# Patient Record
Sex: MALE | Race: WHITE | NOT HISPANIC OR LATINO
[De-identification: names, ages, dates, MRNs, and addresses within clinical notes are randomized per-mention and may not be internally consistent; named-entity substitution may affect disease eponyms.]

---

## 2020-07-02 PROBLEM — Z00.00 ENCOUNTER FOR PREVENTIVE HEALTH EXAMINATION: Status: ACTIVE | Noted: 2020-07-02

## 2020-07-09 ENCOUNTER — APPOINTMENT (OUTPATIENT)
Dept: OTOLARYNGOLOGY | Facility: CLINIC | Age: 65
End: 2020-07-09
Payer: MEDICAID

## 2020-07-09 PROCEDURE — 99203 OFFICE O/P NEW LOW 30 MIN: CPT | Mod: 95

## 2020-07-09 NOTE — HISTORY OF PRESENT ILLNESS
[Home] : at home, [unfilled] , at the time of the visit. [Verbal consent obtained from patient] : the patient, [unfilled] [Medical Office: ([facility])___] : at the medical office located in  [de-identified] : Initial visit. Done face-to-face on the computer secondary to Covid. Consent was given and obtained.\par He reports that 2 months ago while in [place] where he is now he developed exacerbation of his allergies. According to him he was treated with 2 courses of antibiotics and never improved. He also reports he developed significant bilateral ear pressure. He now has tinnitus bilaterally but worse in the right and left.\par He does have a history of recurrent otitis media by his report.\par Is not having any vestibular symptoms.

## 2020-07-16 ENCOUNTER — RESULT CHARGE (OUTPATIENT)
Age: 65
End: 2020-07-16

## 2020-07-17 ENCOUNTER — APPOINTMENT (OUTPATIENT)
Dept: OTOLARYNGOLOGY | Facility: CLINIC | Age: 65
End: 2020-07-17
Payer: MEDICAID

## 2020-07-17 DIAGNOSIS — H93.8X9 OTHER SPECIFIED DISORDERS OF EAR, UNSPECIFIED EAR: ICD-10-CM

## 2020-07-17 DIAGNOSIS — H93.12 TINNITUS, LEFT EAR: ICD-10-CM

## 2020-07-17 DIAGNOSIS — H90.A22 SENSORINEURAL HEARING LOSS, UNILATERAL, LEFT EAR, WITH RESTRICTED HEARING ON THE CONTRALATERAL SIDE: ICD-10-CM

## 2020-07-17 DIAGNOSIS — H93.13 TINNITUS, BILATERAL: ICD-10-CM

## 2020-07-17 DIAGNOSIS — H92.01 OTALGIA, RIGHT EAR: ICD-10-CM

## 2020-07-17 DIAGNOSIS — H69.80 OTHER SPECIFIED DISORDERS OF EUSTACHIAN TUBE, UNSPECIFIED EAR: ICD-10-CM

## 2020-07-17 DIAGNOSIS — M26.609 UNSPECIFIED TEMPOROMANDIBULAR JOINT DISORDER: ICD-10-CM

## 2020-07-17 PROCEDURE — 92557 COMPREHENSIVE HEARING TEST: CPT

## 2020-07-17 PROCEDURE — 92567 TYMPANOMETRY: CPT

## 2020-07-17 PROCEDURE — 99203 OFFICE O/P NEW LOW 30 MIN: CPT | Mod: 25

## 2020-07-17 PROCEDURE — 31231 NASAL ENDOSCOPY DX: CPT

## 2020-07-27 PROBLEM — H69.80 EUSTACHIAN TUBE DYSFUNCTION: Status: ACTIVE | Noted: 2020-07-27

## 2020-07-27 NOTE — ASSESSMENT
[FreeTextEntry1] : He has a normal exam. He is unilateral tinnitus with a slight asymmetric hearing loss. He will be sent for an MRI to rule out a retrocochlear lesion seen in followup.

## 2020-07-27 NOTE — PROCEDURE
[FreeTextEntry6] : PROCEDURE NOTES\par \par PROCEDURE: Nasal endoscopy \par SURGEON: Dr. Lucero\par INDICATIONS: Assess for chronic sinusitis. \par ANESTHESIA: The patient was placed in a sitting position.  Following application of the topical anesthetic and decongestant, exam was performed with a zero degree endoscope.  The scope was passed along the right nasal floor to the nasopharynx.  It was then passed into the region of the middle meatus, middle turbinate, and sphenoethmoid region.  An identical procedure was performed on the left side.  The following findings were noted:\par \par The nasal mucosa was healthy appearing and the septum was roughly midline. The middle meatus and sphenoethmoid recesses were clear bilaterally. The nasopharynx was normal. \par

## 2020-07-27 NOTE — HISTORY OF PRESENT ILLNESS
[de-identified] : Reports that 2 months ago while in [place] he developed significant seasonal allergiies.\par He developed pressure in both ears.\par Seen in the ER and given oral abx and drops.\par Did not improve and was see by ENT MD and told it was related to allergies and Flonase and Claritin was recommended.\par He also used Afrin for a week but stopped after seeing blood.\par \par He is now complaining of right ear pain and cracking.\par bilateral tinnitus, worse on the left than right.

## 2020-08-03 ENCOUNTER — APPOINTMENT (OUTPATIENT)
Dept: CT IMAGING | Facility: HOSPITAL | Age: 65
End: 2020-08-03
Payer: MEDICAID

## 2020-08-03 ENCOUNTER — OUTPATIENT (OUTPATIENT)
Dept: OUTPATIENT SERVICES | Facility: HOSPITAL | Age: 65
LOS: 1 days | End: 2020-08-03
Payer: MEDICAID

## 2020-08-03 PROCEDURE — 70481 CT ORBIT/EAR/FOSSA W/DYE: CPT

## 2020-08-03 PROCEDURE — 70481 CT ORBIT/EAR/FOSSA W/DYE: CPT | Mod: 26

## 2020-08-06 ENCOUNTER — APPOINTMENT (OUTPATIENT)
Dept: MRI IMAGING | Facility: CLINIC | Age: 65
End: 2020-08-06

## 2020-09-17 ENCOUNTER — APPOINTMENT (OUTPATIENT)
Dept: OTOLARYNGOLOGY | Facility: CLINIC | Age: 65
End: 2020-09-17

## 2022-12-16 NOTE — ASSESSMENT
[FreeTextEntry1] : I explained to him that he needed an exam and an audiogram.I could not recommend treatment over the phone at this time.\par She will come in next week when he returns in [place].
Applied
Intact
No